# Patient Record
Sex: FEMALE | URBAN - METROPOLITAN AREA
[De-identification: names, ages, dates, MRNs, and addresses within clinical notes are randomized per-mention and may not be internally consistent; named-entity substitution may affect disease eponyms.]

---

## 2020-08-06 ENCOUNTER — EMERGENCY (EMERGENCY)
Facility: HOSPITAL | Age: 30
LOS: 1 days | Discharge: HOME | End: 2020-08-06
Attending: EMERGENCY MEDICINE | Admitting: EMERGENCY MEDICINE
Payer: SELF-PAY

## 2020-08-06 VITALS
SYSTOLIC BLOOD PRESSURE: 135 MMHG | HEART RATE: 87 BPM | RESPIRATION RATE: 16 BRPM | DIASTOLIC BLOOD PRESSURE: 60 MMHG | OXYGEN SATURATION: 100 % | TEMPERATURE: 98 F

## 2020-08-06 VITALS
HEART RATE: 88 BPM | OXYGEN SATURATION: 100 % | SYSTOLIC BLOOD PRESSURE: 119 MMHG | TEMPERATURE: 98 F | DIASTOLIC BLOOD PRESSURE: 83 MMHG | RESPIRATION RATE: 16 BRPM

## 2020-08-06 DIAGNOSIS — F12.20 CANNABIS DEPENDENCE, UNCOMPLICATED: ICD-10-CM

## 2020-08-06 DIAGNOSIS — F31.9 BIPOLAR DISORDER, UNSPECIFIED: ICD-10-CM

## 2020-08-06 DIAGNOSIS — F43.10 POST-TRAUMATIC STRESS DISORDER, UNSPECIFIED: ICD-10-CM

## 2020-08-06 DIAGNOSIS — F48.9 NONPSYCHOTIC MENTAL DISORDER, UNSPECIFIED: ICD-10-CM

## 2020-08-06 LAB
ALBUMIN SERPL ELPH-MCNC: 4.2 G/DL — SIGNIFICANT CHANGE UP (ref 3.3–5)
ALP SERPL-CCNC: 74 U/L — SIGNIFICANT CHANGE UP (ref 40–120)
ALT FLD-CCNC: 17 U/L — SIGNIFICANT CHANGE UP (ref 4–33)
AMPHET UR-MCNC: NEGATIVE — SIGNIFICANT CHANGE UP
ANION GAP SERPL CALC-SCNC: 12 MMO/L — SIGNIFICANT CHANGE UP (ref 7–14)
APAP SERPL-MCNC: < 15 UG/ML — LOW (ref 15–25)
APPEARANCE UR: CLEAR — SIGNIFICANT CHANGE UP
AST SERPL-CCNC: 20 U/L — SIGNIFICANT CHANGE UP (ref 4–32)
BACTERIA # UR AUTO: NEGATIVE — SIGNIFICANT CHANGE UP
BARBITURATES UR SCN-MCNC: NEGATIVE — SIGNIFICANT CHANGE UP
BASOPHILS # BLD AUTO: 0.05 K/UL — SIGNIFICANT CHANGE UP (ref 0–0.2)
BASOPHILS NFR BLD AUTO: 0.4 % — SIGNIFICANT CHANGE UP (ref 0–2)
BENZODIAZ UR-MCNC: NEGATIVE — SIGNIFICANT CHANGE UP
BILIRUB SERPL-MCNC: < 0.2 MG/DL — LOW (ref 0.2–1.2)
BILIRUB UR-MCNC: NEGATIVE — SIGNIFICANT CHANGE UP
BLOOD UR QL VISUAL: NEGATIVE — SIGNIFICANT CHANGE UP
BUN SERPL-MCNC: 8 MG/DL — SIGNIFICANT CHANGE UP (ref 7–23)
CALCIUM SERPL-MCNC: 10 MG/DL — SIGNIFICANT CHANGE UP (ref 8.4–10.5)
CANNABINOIDS UR-MCNC: POSITIVE — SIGNIFICANT CHANGE UP
CHLORIDE SERPL-SCNC: 101 MMOL/L — SIGNIFICANT CHANGE UP (ref 98–107)
CO2 SERPL-SCNC: 25 MMOL/L — SIGNIFICANT CHANGE UP (ref 22–31)
COCAINE METAB.OTHER UR-MCNC: NEGATIVE — SIGNIFICANT CHANGE UP
COLOR SPEC: SIGNIFICANT CHANGE UP
CREAT SERPL-MCNC: 0.73 MG/DL — SIGNIFICANT CHANGE UP (ref 0.5–1.3)
EOSINOPHIL # BLD AUTO: 0.24 K/UL — SIGNIFICANT CHANGE UP (ref 0–0.5)
EOSINOPHIL NFR BLD AUTO: 2.1 % — SIGNIFICANT CHANGE UP (ref 0–6)
ETHANOL BLD-MCNC: < 10 MG/DL — SIGNIFICANT CHANGE UP
GLUCOSE SERPL-MCNC: 87 MG/DL — SIGNIFICANT CHANGE UP (ref 70–99)
GLUCOSE UR-MCNC: NEGATIVE — SIGNIFICANT CHANGE UP
HCG SERPL-ACNC: < 5 MIU/ML — SIGNIFICANT CHANGE UP
HCT VFR BLD CALC: 35.3 % — SIGNIFICANT CHANGE UP (ref 34.5–45)
HGB BLD-MCNC: 11.1 G/DL — LOW (ref 11.5–15.5)
HYALINE CASTS # UR AUTO: NEGATIVE — SIGNIFICANT CHANGE UP
IMM GRANULOCYTES NFR BLD AUTO: 0.7 % — SIGNIFICANT CHANGE UP (ref 0–1.5)
KETONES UR-MCNC: NEGATIVE — SIGNIFICANT CHANGE UP
LEUKOCYTE ESTERASE UR-ACNC: SIGNIFICANT CHANGE UP
LYMPHOCYTES # BLD AUTO: 3.52 K/UL — HIGH (ref 1–3.3)
LYMPHOCYTES # BLD AUTO: 31.2 % — SIGNIFICANT CHANGE UP (ref 13–44)
MCHC RBC-ENTMCNC: 25.5 PG — LOW (ref 27–34)
MCHC RBC-ENTMCNC: 31.4 % — LOW (ref 32–36)
MCV RBC AUTO: 81.1 FL — SIGNIFICANT CHANGE UP (ref 80–100)
METHADONE UR-MCNC: NEGATIVE — SIGNIFICANT CHANGE UP
MONOCYTES # BLD AUTO: 1.04 K/UL — HIGH (ref 0–0.9)
MONOCYTES NFR BLD AUTO: 9.2 % — SIGNIFICANT CHANGE UP (ref 2–14)
NEUTROPHILS # BLD AUTO: 6.34 K/UL — SIGNIFICANT CHANGE UP (ref 1.8–7.4)
NEUTROPHILS NFR BLD AUTO: 56.4 % — SIGNIFICANT CHANGE UP (ref 43–77)
NITRITE UR-MCNC: NEGATIVE — SIGNIFICANT CHANGE UP
NRBC # FLD: 0 K/UL — SIGNIFICANT CHANGE UP (ref 0–0)
OPIATES UR-MCNC: NEGATIVE — SIGNIFICANT CHANGE UP
OXYCODONE UR-MCNC: NEGATIVE — SIGNIFICANT CHANGE UP
PCP UR-MCNC: NEGATIVE — SIGNIFICANT CHANGE UP
PH UR: 6.5 — SIGNIFICANT CHANGE UP (ref 5–8)
PLATELET # BLD AUTO: 505 K/UL — HIGH (ref 150–400)
PMV BLD: 9.7 FL — SIGNIFICANT CHANGE UP (ref 7–13)
POTASSIUM SERPL-MCNC: 4.1 MMOL/L — SIGNIFICANT CHANGE UP (ref 3.5–5.3)
POTASSIUM SERPL-SCNC: 4.1 MMOL/L — SIGNIFICANT CHANGE UP (ref 3.5–5.3)
PROT SERPL-MCNC: 7.2 G/DL — SIGNIFICANT CHANGE UP (ref 6–8.3)
PROT UR-MCNC: NEGATIVE — SIGNIFICANT CHANGE UP
RBC # BLD: 4.35 M/UL — SIGNIFICANT CHANGE UP (ref 3.8–5.2)
RBC # FLD: 15.9 % — HIGH (ref 10.3–14.5)
RBC CASTS # UR COMP ASSIST: SIGNIFICANT CHANGE UP (ref 0–?)
SALICYLATES SERPL-MCNC: < 5 MG/DL — LOW (ref 15–30)
SARS-COV-2 IGG SERPL QL IA: NEGATIVE — SIGNIFICANT CHANGE UP
SARS-COV-2 IGM SERPL IA-ACNC: 8.44 AU/ML — SIGNIFICANT CHANGE UP
SARS-COV-2 RNA SPEC QL NAA+PROBE: SIGNIFICANT CHANGE UP
SODIUM SERPL-SCNC: 138 MMOL/L — SIGNIFICANT CHANGE UP (ref 135–145)
SP GR SPEC: 1.01 — SIGNIFICANT CHANGE UP (ref 1–1.04)
SQUAMOUS # UR AUTO: SIGNIFICANT CHANGE UP
TSH SERPL-MCNC: 1.06 UIU/ML — SIGNIFICANT CHANGE UP (ref 0.27–4.2)
UROBILINOGEN FLD QL: NORMAL — SIGNIFICANT CHANGE UP
WBC # BLD: 11.27 K/UL — HIGH (ref 3.8–10.5)
WBC # FLD AUTO: 11.27 K/UL — HIGH (ref 3.8–10.5)
WBC UR QL: SIGNIFICANT CHANGE UP (ref 0–?)

## 2020-08-06 RX ORDER — ACETAMINOPHEN 500 MG
650 TABLET ORAL ONCE
Refills: 0 | Status: COMPLETED | OUTPATIENT
Start: 2020-08-06 | End: 2020-08-06

## 2020-08-06 NOTE — ED PROVIDER NOTE - PROGRESS NOTE DETAILS
GENESIS Deleon NP: Patient endorsed from Dr. Campos. Patient is resting but arousable.  CT head noted to be unremarkable.  Will repeat EKG once 6 hours has passed and if unremarkable will medically clear and then wait on final disposition recommendation by ED psychiatry team. Patient cleared by psych, nontoxic and medically stable for discharge. Return precautions provided and patient understands to return to the ED for concerning or worsening signs and symptoms. Instructed to follow up with primary care physician and [facility] crisis center and agreeable. Patient's questions answered.

## 2020-08-06 NOTE — ED ADULT NURSE NOTE - OBJECTIVE STATEMENT
Received pt in  as per EMS pt is manic pt irritable c/o insomnia denies si/hi/avh presently safety & comfort measures maintained eval on going.

## 2020-08-06 NOTE — ED PROVIDER NOTE - PATIENT PORTAL LINK FT
You can access the FollowMyHealth Patient Portal offered by [facility] by registering at the following website: [email or web address]. By joining [employer]’s FollowMyHealth portal, you will also be able to view your health information using other applications (apps) compatible with our system.

## 2020-08-06 NOTE — ED BEHAVIORAL HEALTH ASSESSMENT NOTE - DESCRIPTION
During course of ED visit patient was irritable but redirectable. Patient was not aggressive or violent and did not require PRN medications.    Vital Signs Last 24 Hrs  T(C): 36.9 (06 Aug 2020 08:29), Max: 36.9 (06 Aug 2020 08:29)  T(F): 98.4 (06 Aug 2020 08:29), Max: 98.4 (06 Aug 2020 08:29)  HR: 87 (06 Aug 2020 08:29) (87 - 87)  BP: 135/60 (06 Aug 2020 08:29) (135/60 - 135/60)  BP(mean): --  RR: 16 (06 Aug 2020 08:29) (16 - 16)  SpO2: 100% (06 Aug 2020 08:29) (100% - 100%) celiacs disease, migraines see hpi During course of ED visit patient was initially irritable but redirectable, later she was calm and cooperative. Patient was not aggressive or violent and did not require PRN medications.    Vital Signs Last 24 Hrs  T(C): 36.9 (06 Aug 2020 08:29), Max: 36.9 (06 Aug 2020 08:29)  T(F): 98.4 (06 Aug 2020 08:29), Max: 98.4 (06 Aug 2020 08:29)  HR: 87 (06 Aug 2020 08:29) (87 - 87)  BP: 135/60 (06 Aug 2020 08:29) (135/60 - 135/60)  BP(mean): --  RR: 16 (06 Aug 2020 08:29) (16 - 16)  SpO2: 100% (06 Aug 2020 08:29) (100% - 100%) celiacs disease, migraines, recent uti

## 2020-08-06 NOTE — ED BEHAVIORAL HEALTH ASSESSMENT NOTE - CURRENT MEDICATION
Zyprexa 10mg daily, Lamictal 25mg BID, Atarax PRN Zyprexa 10mg QHS, Lamictal 25mg BID, Atarax PRN Zyprexa 10mg QHS, Lamictal 25mg BID, Atarax PRN, Macrobid

## 2020-08-06 NOTE — ED BEHAVIORAL HEALTH ASSESSMENT NOTE - RISK ASSESSMENT
Patient does not present at imminent risk as evidence by no suicidal thoughts/plan/intent, no homicidal thoughts, no nathalia, not currently psychotic, medication seeking, future oriented, no suicide attempts, able to safety plan, no depression, cooperative during evaluation, no legal issues.     risk factors- substance abuse, recent inpatient discharge, insomnia Low Acute Suicide Risk

## 2020-08-06 NOTE — ED ADULT TRIAGE NOTE - CHIEF COMPLAINT QUOTE
Pt brought in by [facility] in handcuffs (not under arrest) after boyfriend called police for pt acting manic. Pt states she has not slept in days because she can not get her sleeping pills. Pt hx of bi polar disorder, anxiety, and depression. Pt denies si/hiah/vh

## 2020-08-06 NOTE — ED ADULT NURSE REASSESSMENT NOTE - NS ED NURSE REASSESS COMMENT FT1
Pt discharged to home by provider (TATO Deleon). Left  ED at this time. Alert, oriented, ambulatory.  Discharge instructions provided. Pt went home via uber. Will arrange the uber on her own.

## 2020-08-06 NOTE — ED BEHAVIORAL HEALTH ASSESSMENT NOTE - CASE SUMMARY
Patient is a 30 year old single unemployed non-caregiver AAF currently residing in a private residence alone. PPH Bipolar Disorder, PTSD, Cannabis Dependence. Hx of past inpatient admissions- last at [facility] x 3 weeks and discharged 8/3/20. No history of suicide attempts. Patient denies history of legal issues, + history of aggression. + cannabis dependence, no other history of substance use. No history of withdrawal or seizures. PMH Celiacs disease, migraines & recent UTI. BIBA referred by BF for manic behavior.    Patient presents to the ER in the context of manic behavior. Patient reports having issue getting her medication from pharmacy resulting in decreased sleep and irritability. Upon arrival patient was irritable and endorsed migraine symptoms and AH of ringing and visual disturbances. After sleeping she woke up and reported improvement in sleeping and denied any visual or auditory disturbances. She was calm and cooperative. Per collateral she has irritability at baseline likely character pathology. Patient did take extra dose of Zyprexa but denies it was suicidal in nature and stated she just wanted to fall asleep and had missed doses. She was understanding and educated on importance of taking medication as prescribed. She is treatment seeking, able to safety plan and future oriented. She did not present acutely psychotic, manic or depressed. Patient denied SI/HI/SIB/intent/plan. She possibly was displaying hypomanic symptoms related to intoxication and insomnia but at this time does not present this way.  She was offered and refused inpatient psychiatric admission.  She does not meet criteria for involuntary inpatient admission.

## 2020-08-06 NOTE — ED ADULT NURSE NOTE - NSIMPLEMENTINTERV_GEN_ALL_ED
Implemented All Universal Safety Interventions:  [place] to call system. Call bell, personal items and telephone within reach. Instruct patient to call for assistance. Room bathroom lighting operational. Non-slip footwear when patient is off stretcher. Physically safe environment: no spills, clutter or unnecessary equipment. Stretcher in lowest position, wheels locked, appropriate side rails in place.

## 2020-08-06 NOTE — ED PROVIDER NOTE - CLINICAL SUMMARY MEDICAL DECISION MAKING FREE TEXT BOX
29 yo F with bipolar, arrived with manic episode, noted to have not been sleeping for 4 days, with erratic behavior.  Pt reports mild headache, which she attributes to not sleeping.  Will treat symptomatically with tylenol and reassess.  Follow up psych eval.

## 2020-08-06 NOTE — ED BEHAVIORAL HEALTH ASSESSMENT NOTE - VIOLENCE PROTECTIVE FACTORS:
Residential stability/Engagement in treatment Residential stability/Employment stability/Engagement in treatment

## 2020-08-06 NOTE — ED BEHAVIORAL HEALTH ASSESSMENT NOTE - SAFETY PLAN ADDT'L DETAILS
Safety plan discussed with.../Education provided regarding environmental safety / lethal means restriction/Provision of National Suicide Prevention Lifeline 7-299-130-GBRM (1007)

## 2020-08-06 NOTE — ED BEHAVIORAL HEALTH ASSESSMENT NOTE - SUICIDE RISK FACTORS
Current mood episode/Alcohol/Substance abuse disorders/Agitation/Severe Anxiety/Panic Current mood episode/Alcohol/Substance abuse disorders/Agitation/Severe Anxiety/Panic/Insomnia/Mood Disorder current/past/PTSD current/past

## 2020-08-06 NOTE — ED BEHAVIORAL HEALTH ASSESSMENT NOTE - HPI (INCLUDE ILLNESS QUALITY, SEVERITY, DURATION, TIMING, CONTEXT, MODIFYING FACTORS, ASSOCIATED SIGNS AND SYMPTOMS)
Patient is a 30 year old single unemployed non-caregiver AAF currently residing in a private residence alone. PPH Bipolar Disorder, Cannabis Dependence. Hx of past inpatient admissions- last at [facility] x 3 weeks and discharged 8/3/20. No history of suicide attempts. Patient endorses history of aggression and past legal issues but would not disclose further information. + cannabis dependence, no other history of substance use. No history of withdrawal or seizures. PMH Celiacs disease. BIBA referred by BF for manic behavior.    Patient reports she came to the ER because she had a headache. She reports she called 911. She stated she was recently discharged from [facility] but could not get her medication because the pharmacy said it was too soon. She reports she has not slept since Monday since she had no medication. She stated she found 2 Zyprexa pills in her home today and took them at 6am to try to help her sleep but then stated she may have taken more. Informed EM Dr. Campos regarding this.    Patient reports she is not sleeping and "I have been having manic behavior," but should could not elaborate further regarding this. She stated she is having VH of people coming toward her and hearing AH of ringing. She endorsed paranoia stating she believes her old supervisor at [facility] was forging her signature on old documents in 2019 and now there is a pending investigation. 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    Patient was notably irritable, would frequently yell her answers to questions. She was labile and easily agitated but redirectable.    Patient denies denies thought insertion/withdrawal & denies referential thought processes Patient denies any depressive symptoms including depressed mood, anhedonia, preoccupation with death or feelings of guilt. Patient adamantly denies SI, intent or plan; denies any HI, violent thoughts.     See  notes for collateral information Patient is a 30 year old single unemployed non-caregiver AAF currently residing in a private residence alone. PPH Bipolar Disorder, Cannabis Dependence. Hx of past inpatient admissions- last at [facility] x 3 weeks and discharged 8/3/20. No history of suicide attempts. Patient endorses history of aggression and past legal issues but would not disclose further information. + cannabis dependence, no other history of substance use. No history of withdrawal or seizures. PMH Celiacs disease. BIBA referred by BF for manic behavior.    Patient reports she came to the ER because she had a headache. She reports she called 911. She stated she was recently discharged from [facility] but could not get her medication because the pharmacy said it was too soon. She reports she has not slept since Monday since she had no medication. She stated she found 2 Zyprexa pills in her home today and took them at 6am to try to help her sleep. Informed EM Dr. Campos regarding this.    Patient reports she is not sleeping and in the past she has had manic behavior. She endorsed current manic symptoms of insomnia but denied other symptoms. She stated she is having VH of people coming toward her and hearing AH of ringing. She endorsed paranoia stating she believes her old supervisor at [facility] [employer] was forging her signature on old documents in 2019 and now there is a pending investigation. 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    Patient was notably irritable, would frequently yell her answers to questions but redirectable.    Patient denies denies thought insertion/withdrawal & denies referential thought processes Patient denies any depressive symptoms including depressed mood, anhedonia, preoccupation with death or feelings of guilt. Patient adamantly denies SI, intent or plan; denies any HI, violent thoughts.     See  notes for collateral information Patient is a 30 year old single unemployed non-caregiver AAF currently residing in a private residence alone. PPH Bipolar Disorder, Cannabis Dependence. Hx of past inpatient admissions- last at [facility] x 3 weeks and discharged 8/3/20. No history of suicide attempts. Patient denies history of aggression/legal issues. + cannabis dependence, no other history of substance use. No history of withdrawal or seizures. PMH Celiacs disease & migraines. BIBA referred by  for manic behavior.    Patient reports she came to the ER because she had a headache. She reports she called 911. She stated she was recently discharged from [facility] but could not get her medication because the pharmacy said it was too soon. She reports she has not slept since Monday since she had no medication. She stated she found 2 Zyprexa pills in her home today and took them at 6am to try to help her sleep. Informed EM Dr. Campos regarding this. She denied suicidal intent with actions. She stated she also took her regularly scheduled Lamictal and Atarax.     Patient reports she is not sleeping and in the past she has had manic behavior. She endorsed current manic symptoms of insomnia and irritability but denied other symptoms. She stated this AM she was having VH of people coming toward her closer than they actually were and was having hearing AH of ringing. She endorsed paranoia stating she believes her old supervisor at [employer] was forging her signature on old documents in 2019 and now there is a pending investigation. 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 Confirmed above information regarding supervisor with inpatient psychiatrist.     Patient slept and was later re-evaluated. She reported she came to the ER because she was having a migraine and head pain. She again stated she could not sleep because she couldn't get her medication because insurance wouldn't approve it. She reports she is no longer having AH of ringing or visual issues. She was calm.     Patient denies thought insertion/withdrawal & denies referential thought processes  or hallucinations. She denied other symptoms of nathalia/hypomania. Patient denies any depressive symptoms including depressed mood, anhedonia, preoccupation with death or feelings of guilt. Patient adamantly denies SI, intent or plan; denies any HI, violent thoughts.     See  notes for collateral information Patient is a 30 year old single unemployed non-caregiver AAF currently residing in a private residence alone. PPH Bipolar Disorder, Cannabis Dependence. Hx of past inpatient admissions- last at [facility] x 3 weeks and discharged 8/3/20. No history of suicide attempts. Patient denies history of aggression/legal issues. + cannabis dependence, no other history of substance use. No history of withdrawal or seizures. PMH Celiacs disease & migraines. BIBA referred by BF for manic behavior.    Patient reports she came to the ER because she had a headache. She reports she called 911. She stated she was recently discharged from [facility] but could not get her medication because the pharmacy said it was too soon. She reports she has not slept since Monday since she had no medication. She stated she found 2 Zyprexa pills in her home today and took them at 6am to try to help her sleep. Informed EM Dr. Campos regarding this. She denied suicidal intent with actions. She stated she also took her regularly scheduled Lamictal and Atarax.     Patient reports she is not sleeping and in the past she has had manic behavior. She endorsed current manic symptoms of insomnia and irritability but denied other symptoms. She stated this AM she was having VH of people coming toward her closer than they actually were and was having hearing AH of ringing. She endorsed paranoia stating she believes her old supervisor at [employer] [facility] [facility] was forging her signature on old documents in 2019 and now there is a pending investigation. 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 Confirmed above information regarding supervisor with inpatient psychiatrist.     Patient slept and was later re-evaluated. She reported she came to the ER because she was having a migraine and head pain. She again stated she could not sleep because she couldn't get her medication because insurance wouldn't approve it. She reports she is no longer having AH of ringing or visual issues. She was calm and cooperative. She stated she wanted to  her medication from the pharmacy.    Patient denies thought insertion/withdrawal & denies referential thought processes  or hallucinations. She denied other symptoms of nathalia/hypomania. Patient denies any depressive symptoms including depressed mood, anhedonia, preoccupation with death or feelings of guilt. Patient adamantly denies SI, intent or plan; denies any HI, violent thoughts.     See  notes for collateral information    Called [facility] [phone]- Patient has Zyprexa waiting- it is now approved through insurance and available for . Patient is a 30 year old single unemployed non-caregiver AAF currently residing in a private residence alone. PPH Bipolar Disorder, PTSD, Cannabis Dependence. Hx of past inpatient admissions- last at [facility] x 3 weeks and discharged 8/3/20. No history of suicide attempts. Patient denies history of legal issues, + history of aggression. + cannabis dependence, no other history of substance use. No history of withdrawal or seizures. PMH Celiacs disease & migraines. BIBA referred by  for manic behavior.    Patient reports she came to the ER because she had a headache. She reports she called 911. She stated she was recently discharged from [facility] but could not get her medication because the pharmacy said it was too soon. She reports she has not slept since Monday since she had no medication. She stated she found 2 10mg Zyprexa pills in her home today and took them at 6am to try to help her sleep. Informed EM Dr. Campos regarding this. She denied suicidal intent with actions- stating she just wanted to sleep and had missed doses. She stated she also took her regularly scheduled Lamictal and Atarax.     Patient reports when she is not sleeping in the past she has had manic behavior. She endorsed current hypomanic symptoms of insomnia and irritability but denied other symptoms of nathalia/hypomania. She stated this AM she was having visual disturbance of people coming toward her closer than they actually were and was having hearing AH of ringing. 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     Patient slept and was later re-evaluated. She reported she came to the ER because she was having a migraine and head pain. She again stated she could not sleep because she couldn't get her medication because insurance wouldn't approve it. She reports she is no longer having AH of ringing or visual issues. She was calm and cooperative. She stated she wanted to  her medication from the pharmacy.    Patient denies thought insertion/withdrawal & denies referential thought processes  or hallucinations. She was not paranoid. She denied other symptoms of nathalia/hypomania. Patient denies any depressive symptoms including depressed mood, anhedonia, preoccupation with death or feelings of guilt. Patient adamantly denies SI, intent or plan; denies any HI, violent thoughts.     See  notes for collateral information    Called [facility] [phone]- Patient has Zyprexa waiting- it is now approved through insurance and available for . Patient is a 30 year old single unemployed non-caregiver AAF currently residing in a private residence alone. PPH Bipolar Disorder, PTSD, Cannabis Dependence. Hx of past inpatient admissions- last at [facility] x 3 weeks and discharged 8/3/20. No history of suicide attempts. Patient denies history of legal issues, + history of aggression. + cannabis dependence, no other history of substance use. No history of withdrawal or seizures. PMH Celiacs disease, migraines & recent UTI. BIBA referred by  for manic behavior.    Patient reports she came to the ER because she had a headache. She reports she called 911. She stated she was recently discharged from [facility] but could not get her medication because the pharmacy said it was too soon. She reports she has not slept since Monday since she had no medication. She stated she found 2 10mg Zyprexa pills in her home today and took them at 6am to try to help her sleep. Informed EM Dr. Campos regarding this. She denied suicidal intent with actions- stating she just wanted to sleep and had missed doses. She stated she also took her regularly scheduled Lamictal and Atarax.     Patient reports when she is not sleeping in the past she has had manic behavior. She endorsed current hypomanic symptoms of insomnia and irritability but denied other symptoms of nathalia/hypomania. She stated this AM she was having visual disturbance of people coming toward her closer than they actually were and was having hearing AH of ringing. 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     Patient slept and was later re-evaluated. She reported she came to the ER because she was having a migraine and head pain. She again stated she could not sleep because she couldn't get her medication because insurance wouldn't approve it. She reports she is no longer having AH of ringing or visual issues. She was calm and cooperative. She stated she wanted to  her medication from the pharmacy.    Patient denies thought insertion/withdrawal & denies referential thought processes  or hallucinations. She was not paranoid. She denied other symptoms of nathalia/hypomania. Patient denies any depressive symptoms including depressed mood, anhedonia, preoccupation with death or feelings of guilt. Patient adamantly denies SI, intent or plan; denies any HI, violent thoughts.     See  notes for collateral information    Called [facility] [phone]- Patient has Zyprexa waiting- it is now approved through insurance and available for .

## 2020-08-06 NOTE — ED PROVIDER NOTE - NSFOLLOWUPINSTRUCTIONS_ED_ALL_ED_FT
Follow up with your primary care physician and psychiatrist in 48-72 hours.  You may also see the psychiatrist at [facility]:    [address] [place] [address]  Phone: [phone]      SEEK IMMEDIATE MEDICAL CARE IF YOU HAVE ANY OF THE FOLLOWING SYMPTOMS: thoughts about hurting or killing yourself, thoughts about hurting or killing somebody else, hallucinations or any other worsening or persistent symptoms OR ANY NEW OR CONCERNING SYMPTOMS.

## 2020-08-06 NOTE — ED BEHAVIORAL HEALTH NOTE - BEHAVIORAL HEALTH NOTE
Writer contacted [facility] [address] clinic to assist in rescheduling pt’s appointment that NP was informed of by [facility] staff. Writer spoke with Zara at [facility] who checked schedule and found no appointment for pt. SW therefore met with pt to discuss interest in  referral. Pt reporting potential for 8/10/20 appointment but unsure. Writer requested permission to fax referral and follow up for appointment. Pt provided verbal consent. Pt provided follow up number of [phone]; okay to leave . Writer contacted [facility] [address] clinic to assist in rescheduling pt’s appointment that NP was informed of by [facility] staff. Writer spoke with Zara at [facility] who checked schedule and found no appointment for pt. SW therefore met with pt to discuss interest in  referral. Pt reporting potential for 8/10/20 appointment but unsure. Writer requested permission to fax referral and follow up for appointment. Pt provided verbal consent. Pt provided follow up number of [phone]; okay to leave .     referral faxed to [facility] for intake appointment at [phone].

## 2020-08-06 NOTE — ED PROVIDER NOTE - NSFOLLOWUPCLINICS_GEN_ALL_ED_FT
[facility]  Behavioral Health  [address] [place] [address]  Phone: [phone]  Fax:   Follow Up Time:

## 2020-08-06 NOTE — ED ADULT NURSE NOTE - CHIEF COMPLAINT QUOTE
Pt brought in by [facility] in handcuffs (not under arrest) after boyfriend called police for pt acting manic. Pt states she has not slept in days because she can not get her sleeping pills. Pt hx of bi polar disorder, anxiety, and depression. Pt denies si/hiah/vh

## 2020-08-06 NOTE — ED PROVIDER NOTE - ATTENDING CONTRIBUTION TO CARE
I performed the initial face to face bedside interview with this patient regarding history of present illness, review of symptoms and past medical, social and family history.  I completed an independent physical examination.  I was the initial provider who evaluated this patient.  The history, review of symptoms and examination was documented by me.  I have signed out the follow up of any pending tests (i.e. labs, radiological studies) to the NP.  I have discussed the patient’s plan of care and disposition with the NP.

## 2020-08-06 NOTE — ED BEHAVIORAL HEALTH ASSESSMENT NOTE - OTHER PAST PSYCHIATRIC HISTORY (INCLUDE DETAILS REGARDING ONSET, COURSE OF ILLNESS, INPATIENT/OUTPATIENT TREATMENT)
PPH Bipolar Disorder, Cannabis Dependence. Hx of past inpatient admissions- last at [facility] x 3 weeks and discharged 8/3/20. Patient was supposed to have intake today 8/6/20 at [facility]. PPH Bipolar Disorder, PTSD, Cannabis Dependence. Hx of past inpatient admissions- last at [facility] x 3 weeks and discharged 8/3/20.

## 2020-08-06 NOTE — ED BEHAVIORAL HEALTH ASSESSMENT NOTE - OTHER
BF n/a initially endorsed AH of ringing and visual disturbances- later denied inpatient psychiatrist
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
## 2020-08-06 NOTE — ED BEHAVIORAL HEALTH ASSESSMENT NOTE - SUICIDE PROTECTIVE FACTORS
Responsibility to family and others/Identifies reasons for living/Has future plans/Supportive social network of family or friends/Positive therapeutic relationships Responsibility to family and others/Identifies reasons for living/Has future plans/Supportive social network of family or friends/Engaged in work or school/Positive therapeutic relationships

## 2020-08-06 NOTE — ED PROVIDER NOTE - OBJECTIVE STATEMENT
31 yo F with ?celiac disease, and Bipolar disease, anxiety, depression, PTSD arrived with PD in hand cuffs after boyfriend called [place] for patient acting manic.  Pt reports not sleeping for past 4 nights 2/2 not getting sleeping pills last week. 29 yo F with ?celiac disease, and Bipolar disease, anxiety, depression, PTSD arrived with PD in hand cuffs after boyfriend called [facility] for patient acting manic.  Pt reports not sleeping for past 4 nights 2/2 not getting sleeping pills last week.  [facility] reports patient was found outside in bra, sweatpants and high heels [place] the [place].  Pt reports being admitted at [facility] recently, has been unable to fill her maintentance olanzapine.  Pt denies si, hi, ah, vh.
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
## 2020-08-06 NOTE — ED BEHAVIORAL HEALTH ASSESSMENT NOTE - SAFETY PLAN DETAILS
Extensive safety planning performed with patient and family. In addition to extensive discussion of safe places patient can go to, distraction techniques, coping skills, motivational interviewing and who patient can call in the event of crisis including various hotlines. Patient and family agreeing verbally to return patient to ER or call 911 if symptoms worsen or patient has urges to harm self or others.

## 2020-08-06 NOTE — ED BEHAVIORAL HEALTH ASSESSMENT NOTE - DETAILS
n/a [facility] x 3 weeks- discharged 8/3/20 see BH note EM informed regarding headache and migraine type symptoms in [employer] unable to reach BF; mother aware

## 2020-08-06 NOTE — ED BEHAVIORAL HEALTH NOTE - BEHAVIORAL HEALTH NOTE
SW attempted to obtain collateral from pt's boyfriend-LAURA Kiran ([phone]) however phone was off and undersigned left a message.  SW called another 3x however phone remains off.

## 2020-08-06 NOTE — ED ADULT NURSE REASSESSMENT NOTE - NS ED NURSE REASSESS COMMENT FT1
pt lying on bed in nad eyes close breathing even & unlabored pt arousable refusing vs eval on going.

## 2020-08-07 NOTE — ED BEHAVIORAL HEALTH NOTE - BEHAVIORAL HEALTH NOTE
Urgent Referral:   received a call from Gifty at [facility].  Feliciar informed her writer made outreach this morning to patient to discuss if she is continuing treatment with Dr. Aguiar or going to [facility] and will call back to [facility] if pt wants to continue with referral.  Feliciar made second outreach call to patient  [phone] and left a voicemail requesting a callback to social work phone.

## 2020-08-07 NOTE — ED BEHAVIORAL HEALTH NOTE - BEHAVIORAL HEALTH NOTE
Urgent referral :  Pt is already in treatment with Dr. Chirag Aguiar at [facility].  Not appropriate for  referral. Writer called patient  and left a voicemail.

## 2020-08-08 NOTE — ED BEHAVIORAL HEALTH NOTE - BEHAVIORAL HEALTH NOTE
Urgent referral :  Pt is already in treatment with Dr. Chirag Aguiar at [facility] as per prior notation.  Not appropriate for  referral. Writer called patient  and left a voicemail.

## 2021-09-02 ENCOUNTER — OUTPATIENT (OUTPATIENT)
Dept: OUTPATIENT SERVICES | Facility: HOSPITAL | Age: 31
LOS: 1 days | Discharge: PSYCHIATRIC OR SUBSTANCE USE FACILITY | End: 2021-09-02
Payer: MEDICAID

## 2021-09-03 DIAGNOSIS — F33.3 MAJOR DEPRESSIVE DISORDER, RECURRENT, SEVERE WITH PSYCHOTIC SYMPTOMS: ICD-10-CM

## 2021-09-03 DIAGNOSIS — F43.10 POST-TRAUMATIC STRESS DISORDER, UNSPECIFIED: ICD-10-CM

## 2025-05-13 ENCOUNTER — APPOINTMENT (OUTPATIENT)
Dept: ENDOCRINOLOGY | Facility: CLINIC | Age: 35
End: 2025-05-13